# Patient Record
Sex: MALE
[De-identification: names, ages, dates, MRNs, and addresses within clinical notes are randomized per-mention and may not be internally consistent; named-entity substitution may affect disease eponyms.]

---

## 2023-04-26 PROBLEM — Z00.00 ENCOUNTER FOR PREVENTIVE HEALTH EXAMINATION: Status: ACTIVE | Noted: 2023-04-26

## 2023-05-03 ENCOUNTER — APPOINTMENT (OUTPATIENT)
Dept: COLORECTAL SURGERY | Facility: CLINIC | Age: 68
End: 2023-05-03
Payer: MEDICARE

## 2023-05-03 VITALS
HEIGHT: 67 IN | BODY MASS INDEX: 40.02 KG/M2 | HEART RATE: 70 BPM | DIASTOLIC BLOOD PRESSURE: 101 MMHG | WEIGHT: 255 LBS | RESPIRATION RATE: 16 BRPM | SYSTOLIC BLOOD PRESSURE: 182 MMHG

## 2023-05-03 DIAGNOSIS — K64.5 PERIANAL VENOUS THROMBOSIS: ICD-10-CM

## 2023-05-03 DIAGNOSIS — Z86.010 PERSONAL HISTORY OF COLONIC POLYPS: ICD-10-CM

## 2023-05-03 PROCEDURE — 99204 OFFICE O/P NEW MOD 45 MIN: CPT | Mod: 25

## 2023-05-03 PROCEDURE — 46320 REMOVAL OF HEMORRHOID CLOT: CPT

## 2023-05-03 NOTE — HISTORY OF PRESENT ILLNESS
[FreeTextEntry1] : Mr. Mejia presents to the office for a large perianal lump.  He is uncertain of inciting events and reports waking up and noticing a grape sized bump in his perianal region.  He denies any constipation or straining to evacuate his stools or to move heavy items.  There has been no rectal bleeding and he notes some tenderness when he palpates the area.  Last colonoscopy was in December 2022.

## 2023-05-03 NOTE — PHYSICAL EXAM
[Tender, Swollen] : tender, swollen [Thrombosed] : that was thrombosed [No Rash or Lesion] : No rash or lesion [Alert] : alert [Oriented to Person] : oriented to person [Oriented to Place] : oriented to place [Oriented to Time] : oriented to time [Calm] : calm [de-identified] : Large right lateral thrombosed external hemorrhoid [de-identified] : No apparent distress [de-identified] : Normocephalic atraumatic [de-identified] : Moving all extremities x4

## 2023-05-03 NOTE — ASSESSMENT
[FreeTextEntry1] : Mr. Mejia presents to the office with a large right lateral thrombosed external hemorrhoid. I have discussed the etiology for this condition including excess straining with activity or straining to evacuate stools secondary to constipation or diarrhea. In office today, we proceeded to excise the JOSEPH to good effect. The patient was advised on what to expect post procedure. This includes some mild discomfort which should be readily alleviated by over-the-counter pain medications. Sitz baths will need to be performed to keep the wound site clean to facilitate healing. There will be some mild serosanguineous drainage to be anticipated and this should resolve as the wound heals. Cotton gauze should be placed in undergarments to prevent soilage from drainage. A bowel regimen consisting of a high fiber diet, ample daily water intake and miralax as needed is recommended to prevent further episodes of straining and further episodes of thrombosis. He was also advised to avoid heavy lifting and straining for the next two days to prevent reaccumulation of thrombus.  Worsening bleeding from the site can be addressed by laying in the left lateral decubitus position while holding pressure at the site.\par

## 2023-05-03 NOTE — CONSULT LETTER
[Dear  ___] : Dear  [unfilled], [Consult Letter:] : I had the pleasure of evaluating your patient, [unfilled]. [Please see my note below.] : Please see my note below. [Consult Closing:] : Thank you very much for allowing me to participate in the care of this patient.  If you have any questions, please do not hesitate to contact me. [Sincerely,] : Sincerely, [FreeTextEntry3] : Barbara Briscoe MD\par